# Patient Record
Sex: MALE
[De-identification: names, ages, dates, MRNs, and addresses within clinical notes are randomized per-mention and may not be internally consistent; named-entity substitution may affect disease eponyms.]

---

## 2018-03-22 ENCOUNTER — HOSPITAL ENCOUNTER (OUTPATIENT)
Dept: HOSPITAL 88 - RAD | Age: 38
End: 2018-03-22
Attending: FAMILY MEDICINE
Payer: SELF-PAY

## 2018-03-22 DIAGNOSIS — Z00.00: Primary | ICD-10-CM

## 2018-03-22 DIAGNOSIS — M54.5: ICD-10-CM

## 2018-03-22 PROCEDURE — 72100 X-RAY EXAM L-S SPINE 2/3 VWS: CPT

## 2018-03-22 NOTE — DIAGNOSTIC IMAGING REPORT
PROCEDURE:X-RAY LUMBAR SPINE, TWO VIEWS

 

COMPARISON:None.

 

INDICATIONS:LOWER BACK PAIN

 

FINDINGS:

There are 5 lumbar-type vertebral bodies. 

Loss of the normal lumbar lordosis, which may be due to positioning or 

muscle spasm.

The vertebral bodies are well-aligned without evidence of 

spondylolisthesis. 

No acute, displaced fractures, lytic or blastic lesions. The vertebral 

body and disc-space heights are well-maintained. The sacroiliac joints 

are unremarkable.

Nonobstructive bowel gas pattern.

 

CONCLUSION:

Loss of the normal lumbar lordosis, which may be due to positioning or 

muscle spasm. No acute abnormalities. 

 

Edenilson Rosen M.D.  

Dictated by:  Edenilson Rosen M.D. on 3/22/2018 at 17:38     

Electronically approved by:  Edenilson Rosen M.D. on 3/22/2018 at 

17:38